# Patient Record
Sex: MALE | Race: OTHER | HISPANIC OR LATINO | URBAN - METROPOLITAN AREA
[De-identification: names, ages, dates, MRNs, and addresses within clinical notes are randomized per-mention and may not be internally consistent; named-entity substitution may affect disease eponyms.]

---

## 2018-10-01 ENCOUNTER — EMERGENCY (EMERGENCY)
Facility: HOSPITAL | Age: 18
LOS: 1 days | Discharge: HOME | End: 2018-10-01
Attending: EMERGENCY MEDICINE
Payer: MEDICAID

## 2018-10-01 VITALS
SYSTOLIC BLOOD PRESSURE: 135 MMHG | RESPIRATION RATE: 16 BRPM | TEMPERATURE: 98 F | DIASTOLIC BLOOD PRESSURE: 71 MMHG | HEART RATE: 86 BPM | OXYGEN SATURATION: 100 %

## 2018-10-01 VITALS — HEIGHT: 68 IN | WEIGHT: 199.96 LBS

## 2018-10-01 PROCEDURE — 93005 ELECTROCARDIOGRAM TRACING: CPT

## 2018-10-01 PROCEDURE — 71046 X-RAY EXAM CHEST 2 VIEWS: CPT

## 2018-10-01 PROCEDURE — 71046 X-RAY EXAM CHEST 2 VIEWS: CPT | Mod: 26

## 2018-10-01 PROCEDURE — 99283 EMERGENCY DEPT VISIT LOW MDM: CPT

## 2018-10-01 PROCEDURE — 99283 EMERGENCY DEPT VISIT LOW MDM: CPT | Mod: 25

## 2018-10-01 RX ADMIN — Medication 0.5 MILLIGRAM(S): at 21:48

## 2018-10-01 NOTE — ED PROVIDER NOTE - OBJECTIVE STATEMENT
17 y/o M pt with no significant PMHx presents to the ED c/o intermittent chest discomfort x 1 year. Pt reports discomfort is daily and that he occasionally feels a tingling sensation in his finger tips. Pt explains that he is not under any stress but his chest discomfort does cause him to worry at times. Denies any tobacco use or caffeine intake. Nothing has become worse today, he just came to get evaluated. NKDA. 19 y/o M pt with no significant PMHx presents to the ED c/o intermittent chest discomfort x 1 year. Pt reports discomfort is daily and that he occasionally feels a tingling sensation in his finger tips. Pt explains that he is under stress and his chest discomfort does cause him to worry at times too. Feels anxious about work situation now. Denies any tobacco use or caffeine intake. Nothing has become worse today, he just came to get evaluated.  No family history of early cardiac disease. NKDA.

## 2018-10-01 NOTE — ED ADULT NURSE NOTE - NSIMPLEMENTINTERV_GEN_ALL_ED
Implemented All Universal Safety Interventions:  [place] to call system. Call bell, personal items and telephone within reach. Instruct patient to call for assistance. Room bathroom lighting operational. Non-slip footwear when patient is off stretcher. Physically safe environment: no spills, clutter or unnecessary equipment. Stretcher in lowest position, wheels locked, appropriate side rails in place.

## 2018-10-01 NOTE — ED ADULT TRIAGE NOTE - CHIEF COMPLAINT QUOTE
pt stated he wants to be seen because he was having left side chest denies having chest pain at this time

## 2018-10-01 NOTE — ED PROVIDER NOTE - PROGRESS NOTE DETAILS
feels better. shares his grandma made him come in tonight. wants to go home. has pediatrician who he still sees and plans to follow up. denies using recreational drugs when asked in privacy. grandma is suspicious that he might be using drugs. continues to feel at baseline. pt asking to go home. discussed anticipatory guidance and return precautions, including fever, cough, sob, chest pain that recurs and is not improving or any concerning symptom.

## 2024-11-21 NOTE — ED PROVIDER NOTE - CROS ED CARDIOVAS ALL NEG
Called [facility] and they stated that they only have record of seeing patient once 2023.        - - -